# Patient Record
Sex: MALE | Race: WHITE | NOT HISPANIC OR LATINO | Employment: FULL TIME | URBAN - METROPOLITAN AREA
[De-identification: names, ages, dates, MRNs, and addresses within clinical notes are randomized per-mention and may not be internally consistent; named-entity substitution may affect disease eponyms.]

---

## 2018-08-08 ENCOUNTER — HOSPITAL ENCOUNTER (EMERGENCY)
Facility: HOSPITAL | Age: 38
Discharge: HOME | End: 2018-08-08
Attending: EMERGENCY MEDICINE
Payer: COMMERCIAL

## 2018-08-08 VITALS
HEART RATE: 84 BPM | OXYGEN SATURATION: 98 % | SYSTOLIC BLOOD PRESSURE: 144 MMHG | TEMPERATURE: 97.1 F | RESPIRATION RATE: 18 BRPM | DIASTOLIC BLOOD PRESSURE: 75 MMHG | WEIGHT: 240 LBS | HEIGHT: 74 IN | BODY MASS INDEX: 30.8 KG/M2

## 2018-08-08 DIAGNOSIS — K22.2 ESOPHAGEAL OBSTRUCTION DUE TO FOOD IMPACTION: Primary | ICD-10-CM

## 2018-08-08 DIAGNOSIS — T18.128A ESOPHAGEAL OBSTRUCTION DUE TO FOOD IMPACTION: Primary | ICD-10-CM

## 2018-08-08 PROCEDURE — 99283 EMERGENCY DEPT VISIT LOW MDM: CPT

## 2018-08-08 RX ORDER — FEXOFENADINE HCL AND PSEUDOEPHEDRINE HCI 180; 240 MG/1; MG/1
1 TABLET, EXTENDED RELEASE ORAL DAILY
COMMUNITY

## 2018-08-09 NOTE — ED PROVIDER NOTES
History  Chief Complaint   Patient presents with    Foreign Body in Throat     While eating chicken at 2100 pt experienced food lodging in his throat  Has had previous experiences of the same and has had endoscopies for it  Between one and two hours ago, the patient was eating chicken and felt like a piece got stuck in his throat  He can breath normally  But, he cannot not swallow any of his saliva since then or even now  He has had episodes of food sticking that required endoscopies to solve  History provided by:  Patient  Foreign Body in Throat   Suspected object:  Food  Pain quality:  Aching  Pain severity:  Mild  Timing:  Constant  Progression:  Unchanged  Chronicity:  Recurrent  Worsened by:  Drinking  Associated symptoms: drooling, sore throat and trouble swallowing    Associated symptoms: no cough, no difficulty breathing and no nosebleeds        Prior to Admission Medications   Prescriptions Last Dose Informant Patient Reported? Taking?   fexofenadine-pseudoephedrine (ALLEGRA-D 24) 180-240 MG per 24 hr tablet   Yes Yes   Sig: Take 1 tablet by mouth daily      Facility-Administered Medications: None       Past Medical History:   Diagnosis Date    GERD (gastroesophageal reflux disease)        No past surgical history on file  No family history on file  I have reviewed and agree with the history as documented  Social History   Substance Use Topics    Smoking status: Never Smoker    Smokeless tobacco: Never Used    Alcohol use 8 4 oz/week     14 Cans of beer per week        Review of Systems   Constitutional: Negative for chills and fever  HENT: Positive for drooling, sore throat and trouble swallowing  Negative for nosebleeds  Eyes: Negative for discharge  Respiratory: Negative for cough, chest tightness and shortness of breath  Cardiovascular: Negative for chest pain  Genitourinary: Negative for dysuria  Musculoskeletal: Negative for back pain and joint swelling  Neurological: Negative for syncope and headaches  Psychiatric/Behavioral: Negative for confusion  Physical Exam  Physical Exam   Constitutional: He is oriented to person, place, and time  He appears well-developed and well-nourished  HENT:   Head: Normocephalic and atraumatic  Mouth/Throat: Mucous membranes are normal  No oropharyngeal exudate, posterior oropharyngeal erythema or tonsillar abscesses  Eyes: Conjunctivae are normal    Neck: Normal range of motion  Neck supple  Cardiovascular: Normal rate  Pulmonary/Chest: Effort normal    Musculoskeletal: Normal range of motion  Neurological: He is alert and oriented to person, place, and time  Skin: Skin is warm  Vital Signs  ED Triage Vitals [08/08/18 2230]   Temperature Pulse Respirations Blood Pressure SpO2   (!) 97 1 °F (36 2 °C) 84 18 144/75 98 %      Temp Source Heart Rate Source Patient Position - Orthostatic VS BP Location FiO2 (%)   Tympanic Monitor Sitting Left arm --      Pain Score       No Pain           Vitals:    08/08/18 2230   BP: 144/75   Pulse: 84   Patient Position - Orthostatic VS: Sitting       Visual Acuity      ED Medications  Medications - No data to display    Diagnostic Studies  Results Reviewed     None                 No orders to display              Procedures  Procedures       Phone Contacts  ED Phone Contact    ED Course  ED Course as of Aug 09 0243   u Aug 09, 2018   0232 Patient has past history of esophageal food impactions that required endoscopy  His symptoms of pain in neck and constantly spitting up saliva would very strongly suggest that he has chicken bolus lodged in his upper esophagus  I tell patient that we could try an Iv and Glucagon, get a CAT Scan here to verify food in esophagus   However, I inform him that we have no OR or inpatiernt services any longer at [address] [place] [address], when we find the food bolus on our CT testing, we would need to transfer him to a larger hospital with OR capabilities for GI endoscopic removal  The patient states that he would like to sign-out against advice and go directly to a larger hospital himself  I tell him that we can offer IV, medication trial, and CT study here  And, I would gladly get him officially transferred to a larger hospital once we see the food on CT Scan  However, with pain and spitting saliva persisting (plus his past history), it is almost certain food in esophagus will be found and a transfer needed  He says he wants to leave in order to start those treatments at a larger hospital "where they can do the whole job" by actually removing the food bolus by endoscopy  He chooses to leave AMA, signs AMA form, and does not want any further treatments here  MDM  CritCare Time    Disposition  Final diagnoses:   Esophageal obstruction due to food impaction     Time reflects when diagnosis was documented in both MDM as applicable and the Disposition within this note     Time User Action Codes Description Comment    8/9/2018  2:40 AM Arvie Going Add [K22 2,  T93 255P] Esophageal obstruction due to food impaction       ED Disposition     ED Disposition Condition Comment    AMA  Date: 8/9/2018  Patient: Blossom Kawasaki  Admitted: 8/8/2018 10:29 PM  Attending Provider: Farhat Antonio MD    Blossom Kawasaki or his authorized caregiver has made the decision for the patient to leave the emergency department against the advice of the  emergency department staff  He or his authorized caregiver has been informed and understands the inherent risks, including death, trouble swallowing or breathing  He or his authorized caregiver has decided to accept the responsibility for this decision   Chong Lewis and all necessary parties have been advised that he may return for further evaluation or treatment  His condition at time of discharge was stable and oriented    Chong Cooneyevelyn Lewis had current vital signs as follows:  /75 (BP Location: Le ft arm)   Pulse 84   Temp (!) 97 1 °F (36 2 °C) (Tympanic)   Resp 18   Ht 6' 2" (1 88 m)   Wt 109 kg (240 lb)         Follow-up Information    None         Patient's Medications   Discharge Prescriptions    No medications on file     No discharge procedures on file      ED Provider  Electronically Signed by           Stevenson Norman MD  08/09/18 7644